# Patient Record
Sex: FEMALE | Race: WHITE | NOT HISPANIC OR LATINO | Employment: OTHER | URBAN - METROPOLITAN AREA
[De-identification: names, ages, dates, MRNs, and addresses within clinical notes are randomized per-mention and may not be internally consistent; named-entity substitution may affect disease eponyms.]

---

## 2023-10-16 DIAGNOSIS — I15.9 SECONDARY HYPERTENSION: Primary | ICD-10-CM

## 2023-10-16 RX ORDER — AMLODIPINE BESYLATE 10 MG/1
10 TABLET ORAL DAILY
Qty: 90 TABLET | Refills: 0 | Status: SHIPPED | OUTPATIENT
Start: 2023-10-16

## 2024-01-11 DIAGNOSIS — I15.9 SECONDARY HYPERTENSION: ICD-10-CM

## 2024-01-11 RX ORDER — AMLODIPINE BESYLATE 10 MG/1
10 TABLET ORAL DAILY
Qty: 90 TABLET | Refills: 2 | OUTPATIENT
Start: 2024-01-11

## 2024-01-11 NOTE — TELEPHONE ENCOUNTER
Patient called back and asked if she could be seen on 02/13 the same day as her .  I let her know that we have an 11 am appointment.  Patient refused that appointment.  She stated that she wanted an appointment closer to her husbands and I explained that the only available appointment that day was at 11.  She expressed that she was angry and not happy and she doesn't understand why that it takes so long to get an appointment.  She also stated that her  is having a hard time breathing and I advised that he go to the ER.

## 2024-01-11 NOTE — TELEPHONE ENCOUNTER
Called patient and let her know that Dr Campos stated that she needed to be seen.  I asked her if she would like to schedule an appointment and she stated no that she did not want to schedule an appointment at this time.  Patient stated that Dr. Campos did not give her a years worth of her medication ~ however patient has not been seen since 2022.

## 2025-07-28 PROBLEM — Z00.00 MEDICARE WELCOME EXAM: Status: ACTIVE | Noted: 2025-07-28

## 2025-07-28 PROBLEM — I10 PRIMARY HYPERTENSION: Status: ACTIVE | Noted: 2025-07-28

## 2025-07-28 PROBLEM — E78.2 MIXED HYPERLIPIDEMIA: Status: ACTIVE | Noted: 2025-07-28

## 2025-07-28 PROBLEM — D72.821 MONOCYTOSIS: Status: ACTIVE | Noted: 2025-07-28

## 2025-07-29 ENCOUNTER — APPOINTMENT (OUTPATIENT)
Facility: CLINIC | Age: 70
End: 2025-07-29
Payer: MEDICARE

## 2025-07-29 VITALS
DIASTOLIC BLOOD PRESSURE: 96 MMHG | HEART RATE: 78 BPM | HEIGHT: 68 IN | SYSTOLIC BLOOD PRESSURE: 140 MMHG | OXYGEN SATURATION: 98 % | WEIGHT: 146 LBS | BODY MASS INDEX: 22.13 KG/M2

## 2025-07-29 DIAGNOSIS — R25.2 LEG CRAMPING: ICD-10-CM

## 2025-07-29 DIAGNOSIS — E78.2 MIXED HYPERLIPIDEMIA: ICD-10-CM

## 2025-07-29 DIAGNOSIS — Z00.00 MEDICARE WELCOME EXAM: Primary | ICD-10-CM

## 2025-07-29 DIAGNOSIS — Z13.29 SCREENING FOR THYROID DISORDER: ICD-10-CM

## 2025-07-29 DIAGNOSIS — Z13.0 SCREENING FOR BLOOD DISEASE: ICD-10-CM

## 2025-07-29 DIAGNOSIS — Z13.6 SCREENING FOR CARDIOVASCULAR CONDITION: ICD-10-CM

## 2025-07-29 DIAGNOSIS — I10 PRIMARY HYPERTENSION: ICD-10-CM

## 2025-07-29 DIAGNOSIS — D72.821 MONOCYTOSIS: ICD-10-CM

## 2025-07-29 PROCEDURE — 1159F MED LIST DOCD IN RCRD: CPT

## 2025-07-29 PROCEDURE — G0439 PPPS, SUBSEQ VISIT: HCPCS

## 2025-07-29 PROCEDURE — 3077F SYST BP >= 140 MM HG: CPT

## 2025-07-29 PROCEDURE — 3008F BODY MASS INDEX DOCD: CPT

## 2025-07-29 PROCEDURE — 99213 OFFICE O/P EST LOW 20 MIN: CPT

## 2025-07-29 PROCEDURE — 3080F DIAST BP >= 90 MM HG: CPT

## 2025-07-29 PROCEDURE — 1170F FXNL STATUS ASSESSED: CPT

## 2025-07-29 RX ORDER — LOSARTAN POTASSIUM 25 MG/1
25 TABLET ORAL DAILY
Qty: 90 TABLET | Refills: 0 | Status: SHIPPED | OUTPATIENT
Start: 2025-07-29

## 2025-07-29 ASSESSMENT — ACTIVITIES OF DAILY LIVING (ADL)
TAKING_MEDICATION: INDEPENDENT
DOING_HOUSEWORK: INDEPENDENT
DRESSING: INDEPENDENT
GROCERY_SHOPPING: INDEPENDENT
BATHING: INDEPENDENT
MANAGING_FINANCES: INDEPENDENT

## 2025-07-29 ASSESSMENT — PATIENT HEALTH QUESTIONNAIRE - PHQ9
2. FEELING DOWN, DEPRESSED OR HOPELESS: NOT AT ALL
1. LITTLE INTEREST OR PLEASURE IN DOING THINGS: NOT AT ALL
SUM OF ALL RESPONSES TO PHQ9 QUESTIONS 1 AND 2: 0

## 2025-07-29 NOTE — ASSESSMENT & PLAN NOTE
We will repeat your labs at this time and notify you once the results are available. Currently not on statin therapy

## 2025-07-29 NOTE — PROGRESS NOTES
"Chief Complaint  Patient ID: Leonora Simpson \"Genoveva\" is a 70 y.o. female who presents for Annual Exam.    Past Medical, Surgical, and Family History reviewed and updated in chart.    Reviewed all medications by prescribing practitioner or clinical pharmacist (such as prescriptions, OTCs, herbal therapies and supplements) and documented in the medical record.    History of Present Illness  1. MCR  Pap: last pap was many years.   s/p total hysterectomy and denies any vaginal bleeding.   Mammogram: patient continues to defer mammograms   Colonoscopy: last done in 2021 with five year clearance.   --Has had a hx of colon resection   Immunizations: needs prevnar, shingrix and Tdap but defers vaccines at this time  Needs DEXA  Tobacco: smoked cigarettes for 20 years and quit smoking 7 years ago; defers low dose CT     Patient overall doing well. Will be retiring within the coming week.  has been ill currently following with cardiology. Only complaint is occasional LE swelling and mild cramping     2. Hypertension  Blood pressure today on intake was elevated (152/100)  Current antihypertensive regimen is amlodipine 10 mg but patient has since discontinued taking the medication because she felt that her BP was ok without it  Denies any cardiopulmonary symptoms but did have LE swelling on amlodipine     3. Monocytosis  Patient has not recent blood work but had monocytosis on last CBC    4. Hyperlipidemia  LDL is currently < 100 currently not on statin medication     Review of Systems  All pertinent positive symptoms are included in the history of present illness.    All other systems have been reviewed and are negative and noncontributory to this patient's current ailments.    Past Medical History  She has no past medical history on file.    Surgical History  She has no past surgical history on file.     Social History  She reports that she has quit smoking. Her smoking use included cigarettes. She has never used " "smokeless tobacco. No history on file for alcohol use and drug use.    Family History[1]  Medications Prior to Visit[2]    Allergies  Patient has no known allergies.    Immunization History   Administered Date(s) Administered    COVID-19, mRNA, LNP-S, PF, 30 mcg/0.3 mL dose 08/20/2021, 10/04/2021    Pfizer Gray Cap SARS-CoV-2 05/10/2022     Objective   Visit Vitals  BP (!) 140/96   Pulse 78   Ht 1.727 m (5' 8\")   Wt 66.2 kg (146 lb)   SpO2 98%   BMI 22.20 kg/m²   Smoking Status Former   BSA 1.78 m²        BP Readings from Last 3 Encounters:   07/29/25 (!) 140/96   11/09/22 159/77   09/27/21 157/90      Wt Readings from Last 3 Encounters:   07/29/25 66.2 kg (146 lb)   11/09/22 71.7 kg (158 lb)   09/27/21 71.7 kg (158 lb)       Relevant Results  No visits with results within 1 Month(s) from this visit.   Latest known visit with results is:   [facility] Encounter on 10/22/2021   Component Date Value    Pathology Report 10/22/2021                      Value:Name BOOKER MEHTA                                                                                                   Accession #: [id number]            Pathologist:                   CHARLIE BAZZI MD  Date of Procedure:    10/22/2021  Date Received:          10/22/2021  Date Reported           10/25/2021  Submitting Physician:   CASIMIRO CÁRDENAS MD  Location:                    [facility]   Copy To/Referring/Attending:  RENY NIXON, DO Other External #                                                                    FINAL DIAGNOSIS  A.  STOMACH, RANDOM BIOPSIES:  --OXYNTIC AND ANTRAL TYPE GASTRIC MUCOSA WITH NO SIGNIFICANT HISTOPATHOLOGICAL  ABNORMALITIES.  --HELICOBACTER IS NOT IDENTIFIED.                                                                                                                                                                                                                                                                                   " "                                                                                                                                                                                                                            Electronically Signed Out By CHARLIE BAZZI MD/MLC  By the signature on this report, the individual or group listed as making the  Final Interpretation/Diagnosis certifies that they have reviewed this case.           Clinical History:  Physician Contact Number: [phone]  Fixative (A): Formalin  Fixative (B): Formalin  Clinical Diagnosis History GERD  A. Rule out H. pylori      Specimens Submitted As:  A: RANDOM GASTRIC BX     Gross Description:  Received in formalin, labeled with the patient's name and hospital number and  \"A\", are multiple fragments of tan, soft tissue aggregating to 1.0 x 0.2 x 0.2  cm. The specimen is submitted in toto in one cassette.  SBS    sbs/10/22/2021               [facility]  Department of Pathology   [address] [place] [address]        CONVERTED FINAL DIAGNOSIS 10/22/2021                      Value:A.  STOMACH, RANDOM BIOPSIES:  --OXYNTIC AND ANTRAL TYPE GASTRIC MUCOSA WITH NO SIGNIFICANT HISTOPATHOLOGICAL  ABNORMALITIES.  --HELICOBACTER IS NOT IDENTIFIED.      CONVERTED CLINICAL DIAGN* 10/22/2021                      Value:Physician Contact Number: [phone]  Fixative (A): Formalin  Fixative (B): Formalin  Clinical Diagnosis History GERD  A. Rule out H. pylori        CONVERTED GROSS DESCRIPT* 10/22/2021                      Value:Received in formalin, labeled with the patient's name and hospital number and  \"A\", are multiple fragments of tan, soft tissue aggregating to 1.0 x 0.2 x 0.2  cm. The specimen is submitted in toto in one cassette.  SBS      CONVERTED FINAL REPORT P* 10/22/2021 \\copathshare\copath\PDF [id number]      The ASCVD Risk score (Jorge DK, et al., 2019) failed to calculate for the following " reasons:    Cannot find a previous HDL lab    Cannot find a previous total cholesterol lab    Physical Exam  CONSTITUTIONAL - well nourished, well developed, looks like stated age, in no acute distress, not ill-appearing, and not tired appearing  SKIN - normal skin color and pigmentation, normal skin turgor without rash, lesions, or nodules visualized  HEAD - no trauma, normocephalic  EYES - pupils are equal and reactive to light, extraocular muscles are intact, and normal external exam  NECK - supple without rigidity, no neck mass was observed, no thyromegaly or thyroid nodules  CHEST - clear to auscultation, no wheezing, no crackles and no rales, good effort  CARDIAC - regular rate and regular rhythm, no skipped beats, no murmur  ABDOMEN - no organomegaly, soft, nontender, nondistended, normal bowel sounds  EXTREMITIES - no obvious or evident edema, no obvious or evident deformities  NEUROLOGICAL - alert, oriented and no focal signs  PSYCHIATRIC - alert, pleasant and cordial, age-appropriate  IMMUNOLOGIC - no cervical lymphadenopathy    Assessment and Plan  Problem List Items Addressed This Visit       Medicare welcome exam - Primary    Questions reviewed and answered  Recommended mammogram but patient strongly defers at this time. I counseled her to continue breast exams to observe for any masses or nipple changes.  I also referred to OBGYN for bi-manual exams  -Repeat colonoscopy in 2026  -prevnar and shingrix vaccines deferred at this time  -Low dose CT for smoking history deferred at this time         Relevant Orders    Referral to Obstetrics    Monocytosis    We will repeat your labs at this time and notify you once the results are available.           Relevant Orders    CBC and Auto Differential    Mixed hyperlipidemia    We will repeat your labs at this time and notify you once the results are available. Currently not on statin therapy         Relevant Orders    Lipid Panel    Comprehensive Metabolic Panel     Primary hypertension    Blood pressure not at goal!  -discontinued amlodipine for LE swelling in favor of starting losartan 25 mg  -follow-up within 3 months for BP check          Relevant Medications    losartan (Cozaar) 25 mg tablet    Other Relevant Orders    TSH with reflex to Free T4 if abnormal    Comprehensive Metabolic Panel     Other Visit Diagnoses         Screening for cardiovascular condition        Relevant Orders    Lipid Panel      Screening for thyroid disorder        Relevant Orders    TSH with reflex to Free T4 if abnormal      Screening for blood disease        Relevant Orders    CBC and Auto Differential      Leg cramping        Relevant Orders    TSH with reflex to Free T4 if abnormal    Comprehensive Metabolic Panel    CBC and Auto Differential               [1] No family history on file.  [2]   Outpatient Medications Prior to Visit   Medication Sig Dispense Refill    amLODIPine (Norvasc) 10 mg tablet Take 1 tablet by mouth once daily (Patient not taking: Reported on 7/29/2025) 90 tablet 0     No facility-administered medications prior to visit.

## 2025-07-29 NOTE — ASSESSMENT & PLAN NOTE
Questions reviewed and answered  Recommended mammogram but patient strongly defers at this time. I counseled her to continue breast exams to observe for any masses or nipple changes.  I also referred to OBGYN for bi-manual exams  -Repeat colonoscopy in 2026  -prevnar and shingrix vaccines deferred at this time  -Low dose CT for smoking history deferred at this time

## 2025-07-29 NOTE — PROGRESS NOTES
I reviewed and examined the patient. I was present for the key exam elements, and I fully participated in the patient's care. I discussed the management of the care with the resident. I have personally reviewed the pertinent labs and imaging, as well as recent notes, with the patient. I have reviewed the note above and agree with the resident's medical decision making as documented in the resident's note, in addition to the following comments / findings:     Agree with the rest of the plan outlined below by resident physician. No red flags.      The patient understands and agrees to the assessment and plan of care. Patient has also agreed to follow up and comply with the treatment and evaluation as recommended today. Patient was instructed to call the office at [phone] should questions arise regarding their treatment or care.     Daniel Campos DO, FAOASM  Family Medicine   [address], [address]  [place] [address]     Daniel Campos DO

## 2025-08-15 DIAGNOSIS — E87.1 HYPONATREMIA: Primary | ICD-10-CM

## 2025-08-15 LAB
ALBUMIN SERPL-MCNC: 4.6 G/DL (ref 3.6–5.1)
ALP SERPL-CCNC: 45 U/L (ref 37–153)
ALT SERPL-CCNC: 13 U/L (ref 6–29)
ANION GAP SERPL CALCULATED.4IONS-SCNC: 8 MMOL/L (CALC) (ref 7–17)
AST SERPL-CCNC: 18 U/L (ref 10–35)
BASOPHILS # BLD AUTO: 102 CELLS/UL (ref 0–200)
BASOPHILS NFR BLD AUTO: 1.6 %
BILIRUB SERPL-MCNC: 0.6 MG/DL (ref 0.2–1.2)
BUN SERPL-MCNC: 7 MG/DL (ref 7–25)
CALCIUM SERPL-MCNC: 9.4 MG/DL (ref 8.6–10.4)
CHLORIDE SERPL-SCNC: 97 MMOL/L (ref 98–110)
CHOLEST SERPL-MCNC: 205 MG/DL
CHOLEST/HDLC SERPL: 1.7 (CALC)
CO2 SERPL-SCNC: 27 MMOL/L (ref 20–32)
CREAT SERPL-MCNC: 0.79 MG/DL (ref 0.5–1.05)
EGFRCR SERPLBLD CKD-EPI 2021: 81 ML/MIN/1.73M2
EOSINOPHIL # BLD AUTO: 141 CELLS/UL (ref 15–500)
EOSINOPHIL NFR BLD AUTO: 2.2 %
ERYTHROCYTE [DISTWIDTH] IN BLOOD BY AUTOMATED COUNT: 12.8 % (ref 11–15)
GLUCOSE SERPL-MCNC: 106 MG/DL (ref 65–99)
HCT VFR BLD AUTO: 43.7 % (ref 35–45)
HDLC SERPL-MCNC: 122 MG/DL
HGB BLD-MCNC: 14.5 G/DL (ref 11.7–15.5)
LDLC SERPL CALC-MCNC: 72 MG/DL (CALC)
LYMPHOCYTES # BLD AUTO: 1542 CELLS/UL (ref 850–3900)
LYMPHOCYTES NFR BLD AUTO: 24.1 %
MCH RBC QN AUTO: 32.8 PG (ref 27–33)
MCHC RBC AUTO-ENTMCNC: 33.2 G/DL (ref 32–36)
MCV RBC AUTO: 98.9 FL (ref 80–100)
MONOCYTES # BLD AUTO: 646 CELLS/UL (ref 200–950)
MONOCYTES NFR BLD AUTO: 10.1 %
NEUTROPHILS # BLD AUTO: 3968 CELLS/UL (ref 1500–7800)
NEUTROPHILS NFR BLD AUTO: 62 %
NONHDLC SERPL-MCNC: 83 MG/DL (CALC)
PLATELET # BLD AUTO: 211 THOUSAND/UL (ref 140–400)
PMV BLD REES-ECKER: 10.3 FL (ref 7.5–12.5)
POTASSIUM SERPL-SCNC: 4.7 MMOL/L (ref 3.5–5.3)
PROT SERPL-MCNC: 6.9 G/DL (ref 6.1–8.1)
RBC # BLD AUTO: 4.42 MILLION/UL (ref 3.8–5.1)
SODIUM SERPL-SCNC: 132 MMOL/L (ref 135–146)
TRIGL SERPL-MCNC: 37 MG/DL
TSH SERPL-ACNC: 1.9 MIU/L (ref 0.4–4.5)
WBC # BLD AUTO: 6.4 THOUSAND/UL (ref 3.8–10.8)

## 2025-09-23 ENCOUNTER — APPOINTMENT (OUTPATIENT)
Facility: CLINIC | Age: 70
End: 2025-09-23
Payer: MEDICARE

## 2025-11-04 ENCOUNTER — APPOINTMENT (OUTPATIENT)
Facility: CLINIC | Age: 70
End: 2025-11-04
Payer: MEDICARE